# Patient Record
(demographics unavailable — no encounter records)

---

## 2025-06-19 NOTE — ASSESSMENT
[FreeTextEntry1] : - 6/16/25: 35 y/o F here for initial visit presents with concern for possible sinusitis. Approximately 3 weeks ago she had "lump" along left nasal dorsum so she saw ophthalmologist and was diagnosed with dacryocystitis. She took Keflex for 10 days and was advised dacryocystitis resolved, then developed bilateral facial swelling and bruising in malar region. On physical exam she was found to have bilateral mild suborbital edema and ecchymosis, no evidence of sinusitis. She was reassured from ENT standpoint. I called her ophthalmologist, Dr. Morrison to discuss further after patient had left the office and we both agreed patient should follow up with dermatology for further workup and management. Voicemail was left for patient letting her know next steps. Follow up with me as needed.  -Reassurance from that I did not believe that she has a sinus pathology or infection.  I such I see no role for imaging of her sinuses at this time.  This was explained to her.  Clinically there is no evidence of sinusitis and the risks of the radiation from ct scan imaging is not warranted.  -Follow up with dermatology for further workup/ management.  And allergic phenomenon and/or an autoimmune phenomenon has to be considered. -Follow up with me PRN   Addendum: Spoke to Dr. Morrison.  He does not feel that she has dacryocystitis, at this point. I explained him I do not feel that she has a sinusitis.  I see no role for imaging.  I will refer her to her dermatologist and we called to make this recommendation.

## 2025-06-19 NOTE — HISTORY OF PRESENT ILLNESS
[de-identified] : 6/16/25: 35 y/o F here for initial visit presents with concern for possible sinusitis. Approximately 3 weeks ago she had "lump" along left nasal dorsum so she saw ophthalmologist and was diagnosed with dacryocystitis. She was prescribed Keflex every 6 hours for 10 days which she finished over a week ago. The nasal "lump" resolved, but she developed bilateral facial swelling and bruising in malar region. She denies vision changes. During follow up visit her ophthalmologist reassured her dacryocystitis resolved and they recommended she see ENT to evaluate for sinusitis. She denies facial pain, nasal congestion, changes in smell or taste, or postnasal drip. Nonsmoker. Symptoms started after she had Botox injection to forehead for cosmetic reasons approximately 1 month ago.

## 2025-06-19 NOTE — HISTORY OF PRESENT ILLNESS
[de-identified] : 6/16/25: 35 y/o F here for initial visit presents with concern for possible sinusitis. Approximately 3 weeks ago she had "lump" along left nasal dorsum so she saw ophthalmologist and was diagnosed with dacryocystitis. She was prescribed Keflex every 6 hours for 10 days which she finished over a week ago. The nasal "lump" resolved, but she developed bilateral facial swelling and bruising in malar region. She denies vision changes. During follow up visit her ophthalmologist reassured her dacryocystitis resolved and they recommended she see ENT to evaluate for sinusitis. She denies facial pain, nasal congestion, changes in smell or taste, or postnasal drip. Nonsmoker. Symptoms started after she had Botox injection to forehead for cosmetic reasons approximately 1 month ago.  Alert/Awake

## 2025-06-19 NOTE — PHYSICAL EXAM
[TextEntry] : PHYSICAL EXAM  General: The patient was alert and oriented and in no distress. Voice was clear.  Eyes: The patient was alert, oriented and in no distress. Voice was clear.  Eyes: Ocular motility normal. No proptosis. Conjunctiva normal. Sclera white. There was no significant nystagmus or disconjugate gaze noted.  Face: The patient had no facial asymmetry or mass. The skin was unremarkable. bilateral mild suborbital edema and ecchymosis  Ears: External ears were normal without deformity. Ear canals were clear Tympanic membranes were intact and normal. No perforation or effusion  Nose: The external nose had no significant deformity. There was no facial tenderness. On anterior rhinoscopy, the nasal mucosa was healthy in appearance. The anterior septum was midline. There were no visualized polyps purulence or masses. See procedure note for endonasal exam.  Oral cavity: Oral mucosa- normal. Oral and base of tongue- clear and without mass. Gingival and buccal mucosa- moist and without lesions. Palate- the palate moved well. There was no cleft palate. There appeared to be good salivary flow. Oral cavity/oropharynx- no pus, erythema or mass.  Neck: The neck was symmetrical. The parotid and submandibular glands were normal without masses. The trachea was midline and there was no unusual crepitus. Thyroid was smooth and nontender and no masses were palpated. Cervical adenopathy- none.

## 2025-06-19 NOTE — HISTORY OF PRESENT ILLNESS
[de-identified] : 6/16/25: 33 y/o F here for initial visit presents with concern for possible sinusitis. Approximately 3 weeks ago she had "lump" along left nasal dorsum so she saw ophthalmologist and was diagnosed with dacryocystitis. She was prescribed Keflex every 6 hours for 10 days which she finished over a week ago. The nasal "lump" resolved, but she developed bilateral facial swelling and bruising in malar region. She denies vision changes. During follow up visit her ophthalmologist reassured her dacryocystitis resolved and they recommended she see ENT to evaluate for sinusitis. She denies facial pain, nasal congestion, changes in smell or taste, or postnasal drip. Nonsmoker. Symptoms started after she had Botox injection to forehead for cosmetic reasons approximately 1 month ago.

## 2025-06-19 NOTE — PROCEDURE
[FreeTextEntry6] : Nasal Endoscopy: PROCEDURE NOTES  PROCEDURE: Nasal endoscopy SURGEON: Dr. Hwang INDICATIONS: Assess for chronic sinusitis. Verbal consent obtained. ANESTHESIA: The patient was placed in a sitting position. Following application of the topical anesthetic and decongestant, exam was performed with a zero degree endoscope. The scope was passed along the right nasal floor to the nasopharynx. It was then passed into the region of the middle meatus, middle turbinate, and sphenoethmoid region. An identical procedure was performed on the left side. The following findings were noted:   The nasal mucosa was healthy appearing and the septum was roughly midline. The middle meatus and sphenoethmoid recesses were clear bilaterally. The Superior meatus was without lesion or infection. The Inferior, Middle and Superior Turbinates were not enlarged and healthy in appearance. There was not pus, polyps or mass. The nasopharynx was normal.   Tolerated the procedure well.   No evidence of sinusitis

## 2025-06-19 NOTE — HISTORY OF PRESENT ILLNESS
[de-identified] : 6/16/25: 33 y/o F here for initial visit presents with concern for possible sinusitis. Approximately 3 weeks ago she had "lump" along left nasal dorsum so she saw ophthalmologist and was diagnosed with dacryocystitis. She was prescribed Keflex every 6 hours for 10 days which she finished over a week ago. The nasal "lump" resolved, but she developed bilateral facial swelling and bruising in malar region. She denies vision changes. During follow up visit her ophthalmologist reassured her dacryocystitis resolved and they recommended she see ENT to evaluate for sinusitis. She denies facial pain, nasal congestion, changes in smell or taste, or postnasal drip. Nonsmoker. Symptoms started after she had Botox injection to forehead for cosmetic reasons approximately 1 month ago.

## 2025-06-19 NOTE — ASSESSMENT
[FreeTextEntry1] : - 6/16/25: 33 y/o F here for initial visit presents with concern for possible sinusitis. Approximately 3 weeks ago she had "lump" along left nasal dorsum so she saw ophthalmologist and was diagnosed with dacryocystitis. She took Keflex for 10 days and was advised dacryocystitis resolved, then developed bilateral facial swelling and bruising in malar region. On physical exam she was found to have bilateral mild suborbital edema and ecchymosis, no evidence of sinusitis. She was reassured from ENT standpoint. I called her ophthalmologist, Dr. Morrison to discuss further after patient had left the office and we both agreed patient should follow up with dermatology for further workup and management. Voicemail was left for patient letting her know next steps. Follow up with me as needed.  -Reassurance from that I did not believe that she has a sinus pathology or infection.  I such I see no role for imaging of her sinuses at this time.  This was explained to her.  Clinically there is no evidence of sinusitis and the risks of the radiation from ct scan imaging is not warranted.  -Follow up with dermatology for further workup/ management.  And allergic phenomenon and/or an autoimmune phenomenon has to be considered. -Follow up with me PRN   Addendum: Spoke to Dr. Morrison.  He does not feel that she has dacryocystitis, at this point. I explained him I do not feel that she has a sinusitis.  I see no role for imaging.  I will refer her to her dermatologist and we called to make this recommendation.